# Patient Record
Sex: MALE | Race: AMERICAN INDIAN OR ALASKA NATIVE
[De-identification: names, ages, dates, MRNs, and addresses within clinical notes are randomized per-mention and may not be internally consistent; named-entity substitution may affect disease eponyms.]

---

## 2018-06-15 ENCOUNTER — HOSPITAL ENCOUNTER (EMERGENCY)
Dept: HOSPITAL 5 - ED | Age: 38
Discharge: HOME | End: 2018-06-15
Payer: SELF-PAY

## 2018-06-15 VITALS — DIASTOLIC BLOOD PRESSURE: 77 MMHG | SYSTOLIC BLOOD PRESSURE: 122 MMHG

## 2018-06-15 DIAGNOSIS — F17.200: ICD-10-CM

## 2018-06-15 DIAGNOSIS — R19.7: ICD-10-CM

## 2018-06-15 DIAGNOSIS — F11.23: Primary | ICD-10-CM

## 2018-06-15 DIAGNOSIS — R10.9: ICD-10-CM

## 2018-06-15 LAB
BASOPHILS # (AUTO): 0 K/MM3 (ref 0–0.1)
BASOPHILS NFR BLD AUTO: 0.6 % (ref 0–1.8)
BUN SERPL-MCNC: 8 MG/DL (ref 9–20)
BUN/CREAT SERPL: 11 %
CALCIUM SERPL-MCNC: 9.2 MG/DL (ref 8.4–10.2)
EOSINOPHIL # BLD AUTO: 0.5 K/MM3 (ref 0–0.4)
EOSINOPHIL NFR BLD AUTO: 9.4 % (ref 0–4.3)
HCT VFR BLD CALC: 34.4 % (ref 35.5–45.6)
HEMOLYSIS INDEX: 6
HGB BLD-MCNC: 11.3 GM/DL (ref 11.8–15.2)
LYMPHOCYTES # BLD AUTO: 1.5 K/MM3 (ref 1.2–5.4)
LYMPHOCYTES NFR BLD AUTO: 27.3 % (ref 13.4–35)
MCH RBC QN AUTO: 29 PG (ref 28–32)
MCHC RBC AUTO-ENTMCNC: 33 % (ref 32–34)
MCV RBC AUTO: 88 FL (ref 84–94)
MONOCYTES # (AUTO): 0.6 K/MM3 (ref 0–0.8)
MONOCYTES % (AUTO): 10.9 % (ref 0–7.3)
PLATELET # BLD: 250 K/MM3 (ref 140–440)
RBC # BLD AUTO: 3.93 M/MM3 (ref 3.65–5.03)

## 2018-06-15 PROCEDURE — 85025 COMPLETE CBC W/AUTO DIFF WBC: CPT

## 2018-06-15 PROCEDURE — 36415 COLL VENOUS BLD VENIPUNCTURE: CPT

## 2018-06-15 PROCEDURE — 99283 EMERGENCY DEPT VISIT LOW MDM: CPT

## 2018-06-15 PROCEDURE — 80320 DRUG SCREEN QUANTALCOHOLS: CPT

## 2018-06-15 PROCEDURE — G0480 DRUG TEST DEF 1-7 CLASSES: HCPCS

## 2018-06-15 PROCEDURE — 80048 BASIC METABOLIC PNL TOTAL CA: CPT

## 2018-06-15 NOTE — EMERGENCY DEPARTMENT REPORT
ED Psych HPI





- General


Chief Complaint: Medical Clearance


Stated Complaint: OPIOID WITHDRAWLS


Time Seen by Provider: 06/15/18 09:13


Source: patient


Mode of arrival: Ambulatory





- History of Present Illness


Initial Comments: 





Mr. Young is a healthy 37-year-old who has used heroin for the past year.  He 

is now withdrawing from heroin.  His last use of heroin 1 day ago.  He 

registered with the methadone clinic.  First appointment is in 5 days.  He 

requests methadone or any other medications to help him through his withdrawal 

symptoms.  He has mild stomach ache.  +diarrhea.  He has body aches.  He denies 

fever.  Denies headache.  Denies chest pain.  No other symptoms.


Associated Psychiatric Symptoms: none





- Related Data


 Allergies











Allergy/AdvReac Type Severity Reaction Status Date / Time


 


No Known Allergies Allergy   Unverified 06/15/18 08:42














ED Review of Systems


ROS: 


Stated complaint: OPIOID WITHDRAWLS


Other details as noted in HPI





Comment: All other systems reviewed and negative


Constitutional: denies: fever, malaise


Respiratory: denies: cough


Cardiovascular: denies: chest pain





ED Past Medical Hx





- Past Medical History


Previous Medical History?: No





- Surgical History


Past Surgical History?: No





- Social History


Smoking Status: Current Every Day Smoker


Substance Use Type: Other





ED Physical Exam





- General


Limitations: No Limitations


General appearance: alert, in no apparent distress





- Head


Head exam: Present: atraumatic, normocephalic





- Eye


Eye exam: Present: normal appearance





- ENT


ENT exam: Present: mucous membranes moist





- Neck


Neck exam: Present: normal inspection





- Respiratory


Respiratory exam: Present: normal lung sounds bilaterally.  Absent: respiratory 

distress, wheezes, rales, rhonchi





- Cardiovascular


Cardiovascular Exam: Present: regular rate, normal rhythm.  Absent: systolic 

murmur, diastolic murmur, rubs, gallop





- GI/Abdominal


GI/Abdominal exam: Present: soft, normal bowel sounds.  Absent: distended, 

tenderness, guarding, rebound





- Rectal


Rectal exam: Present: deferred





- Extremities Exam


Extremities exam: Present: normal inspection





- Back Exam


Back exam: Present: normal inspection





- Neurological Exam


Neurological exam: Present: alert, oriented X3





- Psychiatric


Psychiatric exam: Present: normal affect, normal mood.  Absent: depressed, 

agitated, anxious, flat affect, manic





- Skin


Skin exam: Present: warm, dry, intact, normal color.  Absent: rash





ED Course





 Vital Signs











  06/15/18





  08:33


 


Temperature 98.4 F


 


Pulse Rate 53 L


 


Respiratory 16





Rate 


 


Blood Pressure 122/77


 


O2 Sat by Pulse 100





Oximetry 














ED Medical Decision Making





- Lab Data


Result diagrams: 


 06/15/18 08:56





 06/15/18 08:56








 Laboratory Results - last 24 hr











  06/15/18 06/15/18 06/15/18





  08:56 08:56 08:56


 


WBC   


 


RBC   


 


Hgb   


 


Hct   


 


MCV   


 


MCH   


 


MCHC   


 


RDW   


 


Plt Count   


 


Lymph % (Auto)   


 


Mono % (Auto)   


 


Eos % (Auto)   


 


Baso % (Auto)   


 


Lymph #   


 


Mono #   


 


Eos #   


 


Baso #   


 


Seg Neutrophils %   


 


Seg Neutrophils #   


 


Sodium   141 


 


Potassium   3.7 


 


Chloride   103.4 


 


Carbon Dioxide   28 


 


Anion Gap   13 


 


BUN   8 L 


 


Creatinine   0.7 L 


 


Estimated GFR   > 60 


 


BUN/Creatinine Ratio   11 


 


Glucose   109 H 


 


Calcium   9.2 


 


Salicylates  < 0.3 L  


 


Plasma/Serum Alcohol    < 0.01














  06/15/18





  08:56


 


WBC  5.4


 


RBC  3.93


 


Hgb  11.3 L


 


Hct  34.4 L


 


MCV  88


 


MCH  29


 


MCHC  33


 


RDW  13.5


 


Plt Count  250


 


Lymph % (Auto)  27.3


 


Mono % (Auto)  10.9 H


 


Eos % (Auto)  9.4 H


 


Baso % (Auto)  0.6


 


Lymph #  1.5


 


Mono #  0.6


 


Eos #  0.5 H


 


Baso #  0.0


 


Seg Neutrophils %  51.8


 


Seg Neutrophils #  2.8


 


Sodium 


 


Potassium 


 


Chloride 


 


Carbon Dioxide 


 


Anion Gap 


 


BUN 


 


Creatinine 


 


Estimated GFR 


 


BUN/Creatinine Ratio 


 


Glucose 


 


Calcium 


 


Salicylates 


 


Plasma/Serum Alcohol 











 Vital Signs - 24 hr











  06/15/18





  08:33


 


Temperature 98.4 F


 


Pulse Rate 53 L


 


Respiratory 16





Rate 


 


Blood Pressure 122/77


 


O2 Sat by Pulse 100





Oximetry 














- Medical Decision Making





Mr. Young presents with mild withdrawal symptoms.  He appears well and 

comfortable. I have prescribed Clonidine 0.1 mg bid x 7 days.  I have provided 

outpatient mental health referrals.


Critical care attestation.: 


If time is entered above; I have spent that time in minutes in the direct care 

of this critically ill patient, excluding procedure time.








ED Disposition


Clinical Impression: 


 Opioid withdrawal





Disposition: DC-01 TO HOME OR SELFCARE


Is pt being admited?: No


Does the pt Need Aspirin: No


Condition: Stable


Instructions:  Opioid Withdrawal (ED)


Referrals: 


[facility] [Outside] - ASAP


Forms:  Work/School Release Form(ED)


Time of Disposition: 09:25